# Patient Record
Sex: MALE
[De-identification: names, ages, dates, MRNs, and addresses within clinical notes are randomized per-mention and may not be internally consistent; named-entity substitution may affect disease eponyms.]

---

## 2019-10-24 ENCOUNTER — LABORATORY RESULT (OUTPATIENT)
Age: 49
End: 2019-10-24

## 2019-10-25 ENCOUNTER — NON-APPOINTMENT (OUTPATIENT)
Age: 49
End: 2019-10-25

## 2019-10-25 ENCOUNTER — APPOINTMENT (OUTPATIENT)
Dept: PULMONOLOGY | Facility: CLINIC | Age: 49
End: 2019-10-25
Payer: COMMERCIAL

## 2019-10-25 VITALS
OXYGEN SATURATION: 95 % | BODY MASS INDEX: 22.4 KG/M2 | HEART RATE: 86 BPM | DIASTOLIC BLOOD PRESSURE: 73 MMHG | SYSTOLIC BLOOD PRESSURE: 108 MMHG | HEIGHT: 71 IN | WEIGHT: 160 LBS

## 2019-10-25 DIAGNOSIS — L50.9 URTICARIA, UNSPECIFIED: ICD-10-CM

## 2019-10-25 DIAGNOSIS — Z00.00 ENCOUNTER FOR GENERAL ADULT MEDICAL EXAMINATION W/OUT ABNORMAL FINDINGS: ICD-10-CM

## 2019-10-25 LAB
BILIRUB UR QL STRIP: NORMAL
CLARITY UR: CLEAR
COLLECTION METHOD: NORMAL
GLUCOSE UR-MCNC: NORMAL
HCG UR QL: 0.2 EU/DL
HGB UR QL STRIP.AUTO: NORMAL
KETONES UR-MCNC: NORMAL
LEUKOCYTE ESTERASE UR QL STRIP: NORMAL
NITRITE UR QL STRIP: NORMAL
PH UR STRIP: 6
PROT UR STRIP-MCNC: NORMAL
SP GR UR STRIP: 1.02

## 2019-10-25 PROCEDURE — 93000 ELECTROCARDIOGRAM COMPLETE: CPT

## 2019-10-25 PROCEDURE — 36415 COLL VENOUS BLD VENIPUNCTURE: CPT

## 2019-10-25 PROCEDURE — 81003 URINALYSIS AUTO W/O SCOPE: CPT | Mod: NC,QW

## 2019-10-25 PROCEDURE — 82043 UR ALBUMIN QUANTITATIVE: CPT | Mod: QW

## 2019-10-25 PROCEDURE — 99396 PREV VISIT EST AGE 40-64: CPT | Mod: 25

## 2019-10-25 NOTE — PLAN
[FreeTextEntry1] : Dr. Brunner GI evaluation: Colonoscopy\par Venipuncture with labs drawn in office\par

## 2019-10-25 NOTE — DATA REVIEWED
[FreeTextEntry1] : EKG showed normal sinus rhythm is 72 beats per minute, no acute ST changes.\par \par Urinalysis is negative

## 2019-10-25 NOTE — REASON FOR VISIT
[Annual Wellness Visit] : an annual wellness visit [FreeTextEntry1] : Complains of mild urticaria fairly regularly, which spontaneously resolves 12 hours later

## 2019-10-25 NOTE — PHYSICAL EXAM
[No Acute Distress] : no acute distress [Well Developed] : well developed [Well-Appearing] : well-appearing [Well Nourished] : well nourished [PERRL] : pupils equal round and reactive to light [Normal Sclera/Conjunctiva] : normal sclera/conjunctiva [EOMI] : extraocular movements intact [Normal Oropharynx] : the oropharynx was normal [Normal Outer Ear/Nose] : the outer ears and nose were normal in appearance [No JVD] : no jugular venous distention [No Lymphadenopathy] : no lymphadenopathy [Thyroid Normal, No Nodules] : the thyroid was normal and there were no nodules present [Supple] : supple [Clear to Auscultation] : lungs were clear to auscultation bilaterally [No Respiratory Distress] : no respiratory distress  [No Accessory Muscle Use] : no accessory muscle use [Normal S1, S2] : normal S1 and S2 [Regular Rhythm] : with a regular rhythm [Normal Rate] : normal rate  [No Murmur] : no murmur heard [No Carotid Bruits] : no carotid bruits [No Abdominal Bruit] : a ~M bruit was not heard ~T in the abdomen [Pedal Pulses Present] : the pedal pulses are present [No Varicosities] : no varicosities [No Edema] : there was no peripheral edema [No Palpable Aorta] : no palpable aorta [No Extremity Clubbing/Cyanosis] : no extremity clubbing/cyanosis [Non-distended] : non-distended [Non Tender] : non-tender [Soft] : abdomen soft [No HSM] : no HSM [No Masses] : no abdominal mass palpated [Normal Bowel Sounds] : normal bowel sounds [Normal Posterior Cervical Nodes] : no posterior cervical lymphadenopathy [Normal Anterior Cervical Nodes] : no anterior cervical lymphadenopathy [No Spinal Tenderness] : no spinal tenderness [No CVA Tenderness] : no CVA  tenderness [No Joint Swelling] : no joint swelling [Grossly Normal Strength/Tone] : grossly normal strength/tone [Coordination Grossly Intact] : coordination grossly intact [No Rash] : no rash [No Focal Deficits] : no focal deficits [Deep Tendon Reflexes (DTR)] : deep tendon reflexes were 2+ and symmetric [Normal Gait] : normal gait [Normal Affect] : the affect was normal [Normal Insight/Judgement] : insight and judgment were intact

## 2019-11-15 LAB
25(OH)D3 SERPL-MCNC: 26.1 NG/ML
A ALTERNATA IGE QN: <0.1 KUA/L
A ALTERNATA IGE QN: <0.1 KUA/L
A FLAVUS AB FLD QL: NEGATIVE
A FUMIGATUS AB FLD QL: NEGATIVE
A FUMIGATUS IGE QN: <0.1 KUA/L
A FUMIGATUS IGE QN: <0.1 KUA/L
A NIGER AB FLD QL: NEGATIVE
ALBUMIN SERPL ELPH-MCNC: 4.5 G/DL
ALBUMIN SERPL ELPH-MCNC: 4.7 G/DL
ALP BLD-CCNC: 71 U/L
ALP BLD-CCNC: 71 U/L
ALT SERPL-CCNC: 21 U/L
ALT SERPL-CCNC: 24 U/L
ANION GAP SERPL CALC-SCNC: 14 MMOL/L
ANION GAP SERPL CALC-SCNC: 18 MMOL/L
ASPERGILLUS FLAVUS PRECIPITINS: NEGATIVE
ASPERGILLUS FUMIGATES PRECIPTINS: NEGATIVE
ASPERGILLUS NIGER PRECIPITINS: NEGATIVE
AST SERPL-CCNC: 22 U/L
AST SERPL-CCNC: 23 U/L
BASOPHILS # BLD AUTO: 0.06 K/UL
BASOPHILS # BLD AUTO: 0.08 K/UL
BASOPHILS NFR BLD AUTO: 0.6 %
BASOPHILS NFR BLD AUTO: 0.9 %
BERMUDA GRASS IGE QN: <0.1 KUA/L
BILIRUB SERPL-MCNC: 0.5 MG/DL
BILIRUB SERPL-MCNC: 0.5 MG/DL
BOXELDER IGE QN: <0.1 KUA/L
BUN SERPL-MCNC: 23 MG/DL
BUN SERPL-MCNC: 23 MG/DL
C ALBICANS IGE QN: 0.17 KUA/L
C HERBARUM IGE QN: <0.1 KUA/L
C HERBARUM IGE QN: <0.1 KUA/L
CALCIUM SERPL-MCNC: 9.1 MG/DL
CALCIUM SERPL-MCNC: 9.2 MG/DL
CALIF WALNUT IGE QN: <0.1 KUA/L
CAT DANDER IGE QN: 0.43 KUA/L
CAT DANDER IGE QN: 0.43 KUA/L
CHLORIDE SERPL-SCNC: 103 MMOL/L
CHLORIDE SERPL-SCNC: 103 MMOL/L
CHOLEST SERPL-MCNC: 223 MG/DL
CHOLEST/HDLC SERPL: 5.1 RATIO
CLAM IGE QN: <0.1 KUA/L
CMN PIGWEED IGE QN: <0.1 KUA/L
CO2 SERPL-SCNC: 20 MMOL/L
CO2 SERPL-SCNC: 23 MMOL/L
CODFISH IGE QN: <0.1 KUA/L
COMMON RAGWEED IGE QN: 1.49 KUA/L
COMMON RAGWEED IGE QN: 1.49 KUA/L
CORN IGE QN: <0.1 KUA/L
COTTONWOOD IGE QN: <0.1 KUA/L
COW MILK IGE QN: <0.1 KUA/L
CREAT SERPL-MCNC: 1.2 MG/DL
CREAT SERPL-MCNC: 1.26 MG/DL
D FARINAE IGE QN: <0.1 KUA/L
D FARINAE IGE QN: <0.1 KUA/L
D PTERONYSS IGE QN: 0.12 KUA/L
D PTERONYSS IGE QN: 0.12 KUA/L
DEPRECATED A ALTERNATA IGE RAST QL: 0
DEPRECATED A ALTERNATA IGE RAST QL: 0
DEPRECATED A FUMIGATUS IGE RAST QL: 0
DEPRECATED A FUMIGATUS IGE RAST QL: 0
DEPRECATED BERMUDA GRASS IGE RAST QL: 0
DEPRECATED BOXELDER IGE RAST QL: 0
DEPRECATED C ALBICANS IGE RAST QL: NORMAL
DEPRECATED C HERBARUM IGE RAST QL: 0
DEPRECATED C HERBARUM IGE RAST QL: 0
DEPRECATED CAT DANDER IGE RAST QL: 1
DEPRECATED CAT DANDER IGE RAST QL: 1
DEPRECATED CLAM IGE RAST QL: 0
DEPRECATED CODFISH IGE RAST QL: 0
DEPRECATED COMMON PIGWEED IGE RAST QL: 0
DEPRECATED COMMON RAGWEED IGE RAST QL: 2
DEPRECATED COMMON RAGWEED IGE RAST QL: 2
DEPRECATED CORN IGE RAST QL: 0
DEPRECATED COTTONWOOD IGE RAST QL: 0
DEPRECATED COW MILK IGE RAST QL: 0
DEPRECATED D FARINAE IGE RAST QL: 0
DEPRECATED D FARINAE IGE RAST QL: 0
DEPRECATED D PTERONYSS IGE RAST QL: NORMAL
DEPRECATED D PTERONYSS IGE RAST QL: NORMAL
DEPRECATED DOG DANDER IGE RAST QL: NORMAL
DEPRECATED DOG DANDER IGE RAST QL: NORMAL
DEPRECATED EGG WHITE IGE RAST QL: 0
DEPRECATED GOOSEFOOT IGE RAST QL: 0
DEPRECATED LONDON PLANE IGE RAST QL: 0
DEPRECATED M RACEMOSUS IGE RAST QL: 0
DEPRECATED MUGWORT IGE RAST QL: 0
DEPRECATED P NOTATUM IGE RAST QL: 0
DEPRECATED PEANUT IGE RAST QL: 0
DEPRECATED RED CEDAR IGE RAST QL: 0
DEPRECATED ROACH IGE RAST QL: NORMAL
DEPRECATED ROACH IGE RAST QL: NORMAL
DEPRECATED SCALLOP IGE RAST QL: <0.1 KUA/L
DEPRECATED SESAME SEED IGE RAST QL: 0
DEPRECATED SHEEP SORREL IGE RAST QL: 0
DEPRECATED SHRIMP IGE RAST QL: NORMAL
DEPRECATED SILVER BIRCH IGE RAST QL: 4
DEPRECATED SOYBEAN IGE RAST QL: 0
DEPRECATED TIMOTHY IGE RAST QL: 0
DEPRECATED TIMOTHY IGE RAST QL: 0
DEPRECATED WALNUT IGE RAST QL: 0
DEPRECATED WHEAT IGE RAST QL: 0
DEPRECATED WHITE ASH IGE RAST QL: 0
DEPRECATED WHITE OAK IGE RAST QL: 3
DEPRECATED WHITE OAK IGE RAST QL: 3
DOG DANDER IGE QN: 0.14 KUA/L
DOG DANDER IGE QN: 0.14 KUA/L
EGG WHITE IGE QN: <0.1 KUA/L
EOSINOPHIL # BLD AUTO: 0.14 K/UL
EOSINOPHIL # BLD AUTO: 0.16 K/UL
EOSINOPHIL NFR BLD AUTO: 1.5 %
EOSINOPHIL NFR BLD AUTO: 1.7 %
ERYTHROCYTE [SEDIMENTATION RATE] IN BLOOD BY WESTERGREN METHOD: 21 MM/HR
ESTIMATED AVERAGE GLUCOSE: 126 MG/DL
GLUCOSE SERPL-MCNC: 84 MG/DL
GLUCOSE SERPL-MCNC: 85 MG/DL
GOOSEFOOT IGE QN: <0.1 KUA/L
HBA1C MFR BLD HPLC: 6 %
HCT VFR BLD CALC: 48.7 %
HCT VFR BLD CALC: 49.3 %
HCV AB SER QL: NONREACTIVE
HCV S/CO RATIO: 0.32 S/CO
HDLC SERPL-MCNC: 44 MG/DL
HGB BLD-MCNC: 15.7 G/DL
HGB BLD-MCNC: 15.9 G/DL
IMM GRANULOCYTES NFR BLD AUTO: 0.2 %
IMM GRANULOCYTES NFR BLD AUTO: 0.2 %
LDLC SERPL CALC-MCNC: 142 MG/DL
LONDON PLANE IGE QN: <0.1 KUA/L
LYMPHOCYTES # BLD AUTO: 1.8 K/UL
LYMPHOCYTES # BLD AUTO: 1.95 K/UL
LYMPHOCYTES NFR BLD AUTO: 19.5 %
LYMPHOCYTES NFR BLD AUTO: 21 %
M RACEMOSUS IGE QN: <0.1 KUA/L
MAGNESIUM SERPL-MCNC: 2.3 MG/DL
MAN DIFF?: NORMAL
MAN DIFF?: NORMAL
MCHC RBC-ENTMCNC: 30 PG
MCHC RBC-ENTMCNC: 30.7 PG
MCHC RBC-ENTMCNC: 32.2 GM/DL
MCHC RBC-ENTMCNC: 32.3 GM/DL
MCV RBC AUTO: 92.9 FL
MCV RBC AUTO: 95.2 FL
MONOCYTES # BLD AUTO: 0.9 K/UL
MONOCYTES # BLD AUTO: 0.93 K/UL
MONOCYTES NFR BLD AUTO: 10 %
MONOCYTES NFR BLD AUTO: 9.7 %
MUGWORT IGE QN: <0.1 KUA/L
MULBERRY (T70) CLASS: 0
MULBERRY (T70) CONC: <0.1 KUA/L
NEUTROPHILS # BLD AUTO: 6.16 K/UL
NEUTROPHILS # BLD AUTO: 6.3 K/UL
NEUTROPHILS NFR BLD AUTO: 66.4 %
NEUTROPHILS NFR BLD AUTO: 68.3 %
P NOTATUM IGE QN: <0.1 KUA/L
PEANUT IGE QN: <0.1 KUA/L
PHOSPHATE SERPL-MCNC: 2.9 MG/DL
PLATELET # BLD AUTO: 185 K/UL
PLATELET # BLD AUTO: 192 K/UL
POTASSIUM SERPL-SCNC: 4.7 MMOL/L
POTASSIUM SERPL-SCNC: 4.8 MMOL/L
PROT SERPL-MCNC: 7.2 G/DL
PROT SERPL-MCNC: 7.2 G/DL
PSA SERPL-MCNC: 0.73 NG/ML
RBC # BLD: 5.18 M/UL
RBC # BLD: 5.24 M/UL
RBC # FLD: 13.2 %
RBC # FLD: 13.2 %
RED CEDAR IGE QN: <0.1 KUA/L
ROACH IGE QN: 0.31 KUA/L
ROACH IGE QN: 0.31 KUA/L
SCALLOP IGE QN: 0
SCALLOP IGE QN: 0.1 KUA/L
SESAME SEED IGE QN: <0.1 KUA/L
SHEEP SORREL IGE QN: <0.1 KUA/L
SILVER BIRCH IGE QN: 28.5 KUA/L
SODIUM SERPL-SCNC: 140 MMOL/L
SODIUM SERPL-SCNC: 141 MMOL/L
SOYBEAN IGE QN: <0.1 KUA/L
T3 SERPL-MCNC: 111 NG/DL
T3RU NFR SERPL: 1 TBI
T4 FREE SERPL-MCNC: 1.2 NG/DL
T4 SERPL-MCNC: 6.7 UG/DL
TIMOTHY IGE QN: <0.1 KUA/L
TIMOTHY IGE QN: <0.1 KUA/L
TREE ALLERG MIX1 IGE QL: 0
TRIGL SERPL-MCNC: 183 MG/DL
TSH SERPL-ACNC: 2.54 UIU/ML
WALNUT IGE QN: <0.1 KUA/L
WBC # FLD AUTO: 9.24 K/UL
WBC # FLD AUTO: 9.28 K/UL
WHEAT IGE QN: <0.1 KUA/L
WHITE ASH IGE QN: <0.1 KUA/L
WHITE ELM IGE QN: 0
WHITE ELM IGE QN: <0.1 KUA/L
WHITE OAK IGE QN: 11.6 KUA/L
WHITE OAK IGE QN: 11.6 KUA/L

## 2019-11-15 RX ORDER — MONTELUKAST 10 MG/1
10 TABLET, FILM COATED ORAL
Qty: 90 | Refills: 3 | Status: ACTIVE | COMMUNITY
Start: 2019-11-15 | End: 1900-01-01

## 2020-10-06 ENCOUNTER — RX RENEWAL (OUTPATIENT)
Age: 50
End: 2020-10-06

## 2020-10-06 RX ORDER — ERGOCALCIFEROL 1.25 MG/1
1.25 MG CAPSULE, LIQUID FILLED ORAL
Qty: 12 | Refills: 3 | Status: ACTIVE | COMMUNITY
Start: 2019-11-15 | End: 1900-01-01

## 2022-05-01 ENCOUNTER — NON-APPOINTMENT (OUTPATIENT)
Age: 52
End: 2022-05-01